# Patient Record
(demographics unavailable — no encounter records)

---

## 2024-12-17 NOTE — DISCUSSION/SUMMARY
[de-identified] : right lumbar radiculopathy severe with associated gastroc weakness on the right side; history of L4/5 and L5/S1 herniated discs in 2022; severe degenerative disc disease L4-S1 with kyphosis of L4/5 on flexion view -decadron taper -MRI lumbar urgent -f/u in 1 week to review mri and clinical progress

## 2024-12-17 NOTE — PHYSICAL EXAM
[de-identified] : Physical Exam:  General: patient is well developed, well nourished, in no acute  distress, alert and oriented x 3.   Mood and affect: normal  Respiratory: no respiratory distress noted  Skin: no scars over spine, skin intact, no erythema, increased warmth  Alignment:The spine is well compensated in the coronal and sagittal plane.    Gait: The patient is able to toe walk and heel walk without difficulty. The patient is able to tandem gait without difficutly.  Palpation: no tenderness to palpation spine or paraspinal region  Range of motion: Lumbar spine ROM is restricted  Neurologic Exam: Motor: 3/5 right GS; Manual Muscle testing in the lower extremities is 5 out of 5 in all muscle groups. There is no evidence of muscular atrophy in the lower extremities. Sensory: Sensation to light touch is grossly intact in the lower extremities  Reflexes: absent right S1 reflex;  DTR are present and symmetric throughout, no clonus, plantar responses are flexor  Hip Exam: No pain with internal or external rotation of hips bilaterally  Special tests: Straight leg raise test positive.  Cross straight leg test positive.  AARTI test negative  Vascular: Examination of the peripheral vascular system demonstrates no evidence of congestion or edema. no evidence of lymphedema bilateral lower extremities, pulses are present and symmetric in both lower extremities. [de-identified] : Xray 12/17/24: my read: L5/S1 severe disc degeneration; L4/5 moderate with kyphosis in flexion  MRI 2022: L4/5 disc herniation with moderate lateral recess stenosis; L5/S1 disc herniation with moderate lateral recess stenosis

## 2024-12-17 NOTE — HISTORY OF PRESENT ILLNESS
[de-identified] : Works as a surgical resident.  Patient GarcesAngy Maza, Female, Age 34-year-old presenting for bilateral Lumbar pain which started 6 weeks ago. No injury or accidents. Patient states that she is having lower back pain which radiated to her right hip/ groin and on the lateral side of her entire right leg.  Severe, disabling, unable to work.   Furthermore, she mentions have a similar pain which only affected her right lumbar are and hip in 2022 and she followed up with her PCP and pain management doctor who gave her a cortisone injection. On the other hand, she did mention a colleague performed a lumbar trigger point which showed no relieve or improvement.  Impressions of Lumbar MRI @ [facility] 5/11/2022 : L4-5 grade 1 degenerative retrolisthesis with a disc bulge, small right central disc herniation, annular fissure, osteophytic ridge and facet arthropathy impinges both descending L5 nerve roots

## 2024-12-24 NOTE — PHYSICAL EXAM
[de-identified] : Physical Exam:  General: patient is well developed, well nourished, in no acute  distress, alert and oriented x 3.   Mood and affect: normal  Respiratory: no respiratory distress noted  Skin: no scars over spine, skin intact, no erythema, increased warmth  Alignment:The spine is well compensated in the coronal and sagittal plane.    Gait: The patient is able to toe walk and heel walk without difficulty. The patient is able to tandem gait without difficutly.  Palpation: no tenderness to palpation spine or paraspinal region  Range of motion: Lumbar spine ROM is restricted  Neurologic Exam: Motor: 3/5 right GS; Manual Muscle testing in the lower extremities is 5 out of 5 in all muscle groups. There is no evidence of muscular atrophy in the lower extremities. Sensory: Sensation to light touch is grossly intact in the lower extremities  Reflexes: absent right S1 reflex;  DTR are present and symmetric throughout, no clonus, plantar responses are flexor  Hip Exam: No pain with internal or external rotation of hips bilaterally  Special tests: Straight leg raise test positive.  Cross straight leg test positive.  AARTI test negative  Vascular: Examination of the peripheral vascular system demonstrates no evidence of congestion or edema. no evidence of lymphedema bilateral lower extremities, pulses are present and symmetric in both lower extremities. [de-identified] : MRI SCAN OF THE LUMBAR SPINE: 12/19/2024 : my read: Large disc herniation L5/S1 with superior extrusion with compression of the right L5 root and severe compresison of the right S1 root.  there is severe central and severe right lateral recess stenosis of L5/S1.  There is severe disc degeneration of L4/5 and L5/S1.  Xray 12/17/24: my read: L5/S1 severe disc degeneration; L4/5 moderate with kyphosis in flexion  MRI 2022: L4/5 disc herniation with moderate lateral recess stenosis; L5/S1 disc herniation with moderate lateral recess stenosis

## 2024-12-24 NOTE — DISCUSSION/SUMMARY
[de-identified] : Large L5/S1 disc herniation with lumbar radiculopathy, significant GS weakness. -has to go to [facility] regarding her visa/work status next week, wishes to defer surgery until after she returns.  understands the time sensitive nature of her weakness in setting of a large disc herniation.   The patient is a candidate for L5/S1 right microdiscectomy and possible barricaid..  Further non operative treatment would include pain management.  Risks, benefits, alternatives were discussed with the patient at great length, including but not limited to, bleeding, infection, persistent neurologic deficit, worsening pain, worsening neurologic status, dural tear, hardware migration/failure, medical complications (including but not limited to cardiac, pulmonary, renal), and the need for further surgery.  We discussed that there is no guarantee of return to pain free status or normal neurologic function.  The patient asked a number of cogent questions.  All questions were answered.  The patient demonstrated understanding of the risks, benefits, and alternatives.  The patient has elected to proceed with surgery, will be booked to match her schedule as she will defer for a short period of time due to her upcoming necessary visa trip.

## 2024-12-24 NOTE — DISCUSSION/SUMMARY
[de-identified] : Large L5/S1 disc herniation with lumbar radiculopathy, significant GS weakness. -has to go to [facility] regarding her visa/work status next week, wishes to defer surgery until after she returns.  understands the time sensitive nature of her weakness in setting of a large disc herniation.   The patient is a candidate for L5/S1 right microdiscectomy and possible barricaid..  Further non operative treatment would include pain management.  Risks, benefits, alternatives were discussed with the patient at great length, including but not limited to, bleeding, infection, persistent neurologic deficit, worsening pain, worsening neurologic status, dural tear, hardware migration/failure, medical complications (including but not limited to cardiac, pulmonary, renal), and the need for further surgery.  We discussed that there is no guarantee of return to pain free status or normal neurologic function.  The patient asked a number of cogent questions.  All questions were answered.  The patient demonstrated understanding of the risks, benefits, and alternatives.  The patient has elected to proceed with surgery, will be booked to match her schedule as she will defer for a short period of time due to her upcoming necessary visa trip.

## 2024-12-24 NOTE — HISTORY OF PRESENT ILLNESS
[de-identified] : feels improvement since decadron but still with significant pain and still has weakness of the right lower extremity.  here for MRI Review.  JAMARI CAROLINA, 34 year old Female, presenting for follow- up imaging review: MRI Lumbar 12/19/2024 - [facility]

## 2024-12-24 NOTE — PHYSICAL EXAM
[de-identified] : Physical Exam:  General: patient is well developed, well nourished, in no acute  distress, alert and oriented x 3.   Mood and affect: normal  Respiratory: no respiratory distress noted  Skin: no scars over spine, skin intact, no erythema, increased warmth  Alignment:The spine is well compensated in the coronal and sagittal plane.    Gait: The patient is able to toe walk and heel walk without difficulty. The patient is able to tandem gait without difficutly.  Palpation: no tenderness to palpation spine or paraspinal region  Range of motion: Lumbar spine ROM is restricted  Neurologic Exam: Motor: 3/5 right GS; Manual Muscle testing in the lower extremities is 5 out of 5 in all muscle groups. There is no evidence of muscular atrophy in the lower extremities. Sensory: Sensation to light touch is grossly intact in the lower extremities  Reflexes: absent right S1 reflex;  DTR are present and symmetric throughout, no clonus, plantar responses are flexor  Hip Exam: No pain with internal or external rotation of hips bilaterally  Special tests: Straight leg raise test positive.  Cross straight leg test positive.  AARTI test negative  Vascular: Examination of the peripheral vascular system demonstrates no evidence of congestion or edema. no evidence of lymphedema bilateral lower extremities, pulses are present and symmetric in both lower extremities. [de-identified] : MRI SCAN OF THE LUMBAR SPINE: 12/19/2024 : my read: Large disc herniation L5/S1 with superior extrusion with compression of the right L5 root and severe compresison of the right S1 root.  there is severe central and severe right lateral recess stenosis of L5/S1.  There is severe disc degeneration of L4/5 and L5/S1.  Xray 12/17/24: my read: L5/S1 severe disc degeneration; L4/5 moderate with kyphosis in flexion  MRI 2022: L4/5 disc herniation with moderate lateral recess stenosis; L5/S1 disc herniation with moderate lateral recess stenosis

## 2024-12-24 NOTE — HISTORY OF PRESENT ILLNESS
[de-identified] : feels improvement since decadron but still with significant pain and still has weakness of the right lower extremity.  here for MRI Review.  JAMARI CAROLINA, 34 year old Female, presenting for follow- up imaging review: MRI Lumbar 12/19/2024 - [facility]

## 2024-12-27 NOTE — HISTORY OF PRESENT ILLNESS
[No Pertinent Cardiac History] : no history of aortic stenosis, atrial fibrillation, coronary artery disease, recent myocardial infarction, or implantable device/pacemaker [No Pertinent Pulmonary History] : no history of asthma, COPD, sleep apnea, or smoking [(Patient denies any chest pain, claudication, dyspnea on exertion, orthopnea, palpitations or syncope)] : Patient denies any chest pain, claudication, dyspnea on exertion, orthopnea, palpitations or syncope [Moderate (4-6 METs)] : Moderate (4-6 METs) [Self] : no previous adverse anesthesia reaction [Chronic Anticoagulation] : no chronic anticoagulation [Chronic Kidney Disease] : no chronic kidney disease [Diabetes] : no diabetes [FreeTextEntry1] : RIGHT L5S1 MICRODISCECTOMY POSSIBLE BARRICAID [FreeTextEntry2] : 1/3/25 [FreeTextEntry4] : 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 prior to R L5S1 microdiscectomy.  Sensitive to opioids (nausea).   Prior rash with a contraceptive transdermal patch. 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

## 2024-12-27 NOTE — HISTORY OF PRESENT ILLNESS
[No Pertinent Cardiac History] : no history of aortic stenosis, atrial fibrillation, coronary artery disease, recent myocardial infarction, or implantable device/pacemaker [No Pertinent Pulmonary History] : no history of asthma, COPD, sleep apnea, or smoking [(Patient denies any chest pain, claudication, dyspnea on exertion, orthopnea, palpitations or syncope)] : Patient denies any chest pain, claudication, dyspnea on exertion, orthopnea, palpitations or syncope [Moderate (4-6 METs)] : Moderate (4-6 METs) [Self] : no previous adverse anesthesia reaction [Chronic Anticoagulation] : no chronic anticoagulation [Chronic Kidney Disease] : no chronic kidney disease [Diabetes] : no diabetes [FreeTextEntry1] : RIGHT L5S1 MICRODISCECTOMY POSSIBLE BARRICAID [FreeTextEntry2] : 1/3/25 [FreeTextEntry4] : 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 prior to R L5S1 microdiscectomy.  Sensitive to opioids (nausea).   Prior rash with a contraceptive transdermal patch. 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

## 2024-12-27 NOTE — ASSESSMENT
[High Risk Surgery - Intraperitoneal, Intrathoracic or Supringuinal Vascular Procedures] : High Risk Surgery - Intraperitoneal, Intrathoracic or Supringuinal Vascular Procedures - No (0) [Ischemic Heart Disease] : Ischemic Heart Disease - No (0) [Congestive Heart Failure] : Congestive Heart Failure - No (0) [Prior Cerebrovascular Accident or TIA] : Prior Cerebrovascular Accident or TIA - No (0) [Creatinine >= 2mg/dL (1 Point)] : Creatinine >= 2mg/dL - No (0) [Insulin-dependent Diabetic (1 Point)] : Insulin-dependent Diabetic - No (0) [0] : 0 , RCRI Class: I, Risk of Post-Op Cardiac Complications: 3.9%, 95% CI for Risk Estimate: 2.8% - 5.4% [FreeTextEntry4] : 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  ECG obtained today without ischemic changes, able to achieve >4 METs and no anginal symptoms. She is considered low risk for an intermediate risk procedure pending labs.

## 2024-12-27 NOTE — ASSESSMENT
[High Risk Surgery - Intraperitoneal, Intrathoracic or Supringuinal Vascular Procedures] : High Risk Surgery - Intraperitoneal, Intrathoracic or Supringuinal Vascular Procedures - No (0) [Ischemic Heart Disease] : Ischemic Heart Disease - No (0) [Congestive Heart Failure] : Congestive Heart Failure - No (0) [Prior Cerebrovascular Accident or TIA] : Prior Cerebrovascular Accident or TIA - No (0) [Creatinine >= 2mg/dL (1 Point)] : Creatinine >= 2mg/dL - No (0) [Insulin-dependent Diabetic (1 Point)] : Insulin-dependent Diabetic - No (0) [0] : 0 , RCRI Class: I, Risk of Post-Op Cardiac Complications: 3.9%, 95% CI for Risk Estimate: 2.8% - 5.4% [FreeTextEntry4] : 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  ECG obtained today without ischemic changes, able to achieve >4 METs and no anginal symptoms. She is considered low risk for an intermediate risk procedure pending labs.

## 2025-01-02 NOTE — HISTORY OF PRESENT ILLNESS
[FreeTextEntry1] : 33 y/o female with no PMHx who presented for evaluation of herniated disc at L5-S1. Herniated disc was originally dx over 10 yrs ago and pain resolved with LAYNE. Now most recently with worsening pain x 6 weeks, located in lower back and right foot with associated numbness/weakness. S/P L5-S1 microdiscectomy with Dr. D'Amico 12/30/24.   Hospital Course: 12/29: admitted for L5-S1 herniated disc, preop discectomy tomorrow. CT Lspine, scoli films completed 12/30: Pre-op for OR today. POD 0 L5-S1 microdiscectomy  Presents today over the phone for post op check in.  She endorses she is overall recovering well. Notes some incisional soreness that she is taking tylenol for. Her right foot numbness, weakness, heaviness have resolved postop.  Denies signs of any postop wound infection which could include but not limited to redness, swelling, purulent drainage.  Has been walking and has been adherent to restrictions of no bending, lifting, twisting, physical activities. Refraining from work.

## 2025-01-02 NOTE — REASON FOR VISIT
[Home] : at home, [unfilled] , at the time of the visit. [Medical Office: ([facility])___] : at the medical office located in  [Verbal consent obtained from patient] : the patient, [unfilled] [de-identified] : L5-S1 microdiscectomy [de-identified] : 12/30/24

## 2025-01-02 NOTE — ASSESSMENT
[FreeTextEntry1] : 33 y/o female with no PMHx who presented for evaluation of herniated disc at L5-S1. Herniated disc was originally dx over 10 yrs ago and pain resolved with LAYNE. Now most recently with worsening pain x 6 weeks, located in lower back and right foot with associated numbness/weakness. Now S/P L5-S1 microdiscectomy with Dr. D'Amico 12/30/24.   Postop with resolution of right foot numbness/weakness. Overall reportedly recovering well without complaints. Adhering to postop restrictions of no bending, lifting, twisting, physical activities. Walking without issue.  Incision reportedly CDI, no drainage/redness.   Will plan for in person apt next week as scheduled.  Continue to adhere to mobility restrictions/ stay out of work at this time to allow for recovery.   A total of 5 minutes was spent reviewing the patient's history, any available imaging, and verbal examination of the patient. The patient's questions were answered.  The patient demonstrated an excellent understanding of todays discussion and next steps in treatment plan.

## 2025-01-08 NOTE — HISTORY OF PRESENT ILLNESS
[FreeTextEntry1] : 35 y/o female with no PMHx who presented for evaluation of herniated disc at L5-S1. Herniated disc was originally dx over 10 yrs ago and pain resolved with LAYNE. Now most recently with worsening pain x 6 weeks, located in lower back and right foot with associated numbness/weakness. S/P right L5-S1 microdiscectomy 12/30/24.  Hospital Course: 12/29: admitted for L5-S1 herniated disc, preop discectomy tomorrow. CT Lspine, scoli films completed 12/30: Pre-op for OR today. POD 0 L5-S1 microdiscectomy  Presents today for postop follow- up.  She endorses she is overall recovering well. Notes some incisional soreness that she is taking tylenol for. Her right foot numbness, weakness, heaviness have resolved postop.

## 2025-01-08 NOTE — ASSESSMENT
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 She had previously failed conservative management and presented with intermittent right S1 radiculopathy, including weakness and paresthesias. She reports significant improvement in her pain since surgery. Her surgical site is healing well. She will follow up again in three months and was instructed to contact us sooner if any concerns arise.  Dr. D'Amico independently reviewed all available images with patient.   PLAN: - Physical therapy referral - RTC 3 months for follow- up and check in  Educated no bending, lifting, twisting, or strenuous activities for 6 weeks.  Discussed walking is recommended as tolerated.   Patient verbalizes understanding of today's discussion and next steps in treatment plan.   Follow- up, post ops, etc: Today, my ACP, Allison Arevalo, was here to observe my evaluation and management services for this new problem/exacerbated condition to be followed going forward.     A total of 15 minutes was spent reviewing the labs, imaging and physical examination of the patient. We discussed the diagnosis, and the plan. The patient's questions were answered. The patient demonstrated an excellent understanding of the plan.

## 2025-01-08 NOTE — SOCIAL HISTORY
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

## 2025-01-08 NOTE — SOCIAL HISTORY
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

## 2025-01-08 NOTE — PHYSICAL EXAM
[General Appearance - Alert] : alert [General Appearance - In No Acute Distress] : in no acute distress [Oriented To Time, Place, And Person] : oriented to person, place, and time [Impaired Insight] : insight and judgment were intact [Affect] : the affect was normal [Memory Recent] : recent memory was not impaired [Motor Tone] : muscle tone was normal in all four extremities [Motor Strength] : muscle strength was normal in all four extremities [Sclera] : the sclera and conjunctiva were normal [Neck Appearance] : the appearance of the neck was normal [] : no respiratory distress [Respiration, Rhythm And Depth] : normal respiratory rhythm and effort [Abnormal Walk] : normal gait [Skin Color & Pigmentation] : normal skin color and pigmentation [Clean] : clean [Dry] : dry [Healing Well] : healing well [No Drainage] : without drainage [Normal Skin] : normal [Erythema] : not erythematous [Warm] : not warm

## 2025-04-16 NOTE — REASON FOR VISIT
[Follow-Up: _____] : a [unfilled] follow-up visit [FreeTextEntry1] : S/P L5-S1 microdiscectomy 12/30/24. Three month follow- up and check in

## 2025-04-16 NOTE — HISTORY OF PRESENT ILLNESS
[FreeTextEntry1] : 33 y/o female with no PMHx who presented for evaluation of herniated disc at L5-S1. Herniated disc was originally dx over 10 yrs ago and pain resolved with LAYNE. Now most recently with worsening pain x 6 weeks, located in lower back and right foot with associated numbness/weakness. S/P right L5-S1 microdiscectomy 12/30/24.  1/8/25 pt returned for postop follow- up. Overall recovering well with some incisional soreness. Right foot numbness/ weakness/ heaviness that she was having pre- op resolved.  Recommended 3 month follow- up and check in.    Returns TODAY for follow- up and check in.

## 2025-04-17 NOTE — HISTORY OF PRESENT ILLNESS
[FreeTextEntry1] : 35 y/o female with no PMHx who presented for evaluation of herniated disc at L5-S1. Herniated disc was originally dx over 10 yrs ago and pain resolved with LAYNE. Now most recently with worsening pain x 6 weeks, located in lower back and right foot with associated numbness/weakness. S/P right L5-S1 microdiscectomy 12/30/24.  1/8/25 pt returned for postop follow- up. Overall recovering well with some incisional soreness. Right foot numbness/ weakness/ heaviness that she was having pre- op resolved.  Recommended 3 month follow- up and check in.    Returns TODAY for follow- up and check in.

## 2025-04-29 NOTE — REASON FOR VISIT
[Home] : at home, [unfilled] , at the time of the visit. [Medical Office: ([facility])___] : at the medical office located in  [Telephone (audio)] : This telephonic visit was provided via audio only technology. [Patient preference] : patient preference. [Verbal consent obtained from patient] : the patient, [unfilled] [FreeTextEntry1] : S/P L5-S1 microdiscectomy 12/30/24. Three month follow- up and check in

## 2025-04-29 NOTE — REASON FOR VISIT
[Home] : at home, [unfilled] , at the time of the visit. [Medical Office: ([facility])___] : at the medical office located in  [Telephone (audio)] : This telephonic visit was provided via audio only technology. [Patient preference] : patient preference. [Verbal consent obtained from patient] : the patient, [unfilled] [FreeTextEntry1] : S/P L5-S1 microdiscectomy 12/30/24. Three month follow- up and check in

## 2025-04-29 NOTE — HISTORY OF PRESENT ILLNESS
[FreeTextEntry1] : 35 y/o female with no PMHx who presented for evaluation of herniated disc at L5-S1. Herniated disc was originally dx over 10 yrs ago and pain resolved with LAYNE. Now most recently with worsening pain x 6 weeks, located in lower back and right foot with associated numbness/weakness. S/P right L5-S1 microdiscectomy 12/30/24.  1/8/25 pt returned for postop follow- up. Overall recovering well with some incisional soreness. Right foot numbness/ weakness/ heaviness that she was having pre- op resolved.  Recommended 3 month follow- up and check in.   Returns TODAY for follow- up and check in.  She endorses overall doing well. Without complaints for today's visit.  Incision has healed.

## 2025-04-29 NOTE — HISTORY OF PRESENT ILLNESS
[FreeTextEntry1] : 33 y/o female with no PMHx who presented for evaluation of herniated disc at L5-S1. Herniated disc was originally dx over 10 yrs ago and pain resolved with LAYNE. Now most recently with worsening pain x 6 weeks, located in lower back and right foot with associated numbness/weakness. S/P right L5-S1 microdiscectomy 12/30/24.  1/8/25 pt returned for postop follow- up. Overall recovering well with some incisional soreness. Right foot numbness/ weakness/ heaviness that she was having pre- op resolved.  Recommended 3 month follow- up and check in.   Returns TODAY for follow- up and check in.  She endorses overall doing well. Without complaints for today's visit.  Incision has healed.

## 2025-04-29 NOTE — ASSESSMENT
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 She had previously failed conservative management and presented with intermittent right S1 radiculopathy, including weakness and paresthesias. She reports significant improvement in her pain since surgery. Her surgical site is healing well. She will follow up again PRN.    Dr. D'Amico independently reviewed all available images with patient.   PLAN: - RTC prn   Patient verbalizes understanding of today's discussion and next steps in treatment plan.   Today, my ACP, Allison Arevalo, was here to observe my evaluation and management services for this new problem/exacerbated condition to be followed going forward.     A total of 15 minutes was spent reviewing the labs, imaging and physical examination of the patient. We discussed the diagnosis, and the plan. The patient's questions were answered. The patient demonstrated an excellent understanding of the plan.

## 2025-04-29 NOTE — REASON FOR VISIT
[Home] : at home, [unfilled] , at the time of the visit. [Medical Office: ([facility])___] : at the medical office located in  [Telephone (audio)] : This telephonic visit was provided via audio only technology. [Patient preference] : patient preference. [Verbal consent obtained from patient] : the patient, [unfilled] [FreeTextEntry1] : S/P L5-S1 microdiscectomy 12/30/24. Three month follow- up and check in